# Patient Record
Sex: FEMALE | Race: WHITE | Employment: FULL TIME | URBAN - METROPOLITAN AREA
[De-identification: names, ages, dates, MRNs, and addresses within clinical notes are randomized per-mention and may not be internally consistent; named-entity substitution may affect disease eponyms.]

---

## 2017-06-29 ENCOUNTER — HOSPITAL ENCOUNTER (OUTPATIENT)
Dept: PHYSICAL THERAPY | Age: 52
Discharge: HOME | End: 2017-06-29
Payer: COMMERCIAL

## 2017-06-29 PROCEDURE — 97162 PT EVAL MOD COMPLEX 30 MIN: CPT

## 2017-06-29 PROCEDURE — 97110 THERAPEUTIC EXERCISES: CPT

## 2017-06-29 NOTE — PROGRESS NOTES
[facility] at [address] [facility]  [address]  Phone: [phone]      Fax:  [phone]    Plan of Care/ Statement of Necessity for Physical Therapy Services  Patient name: Aristeo Tilley Start of Care: 2017   Referral source: Whitney Saul MD : 1965    Medical Diagnosis: Right knee pain [M25.561]  Left knee pain [M25.562]   Onset Date: 2017    Treatment Diagnosis: B knee pain   Prior Hospitalization: see medical history Provider#: [id number]   Medications: Verified on Patient summary List    Comorbidities: heart disease, DM, arthritis, HTN, asthma   Prior Level of Function: Reports having \"arthritis\" pain in B knees before injury. The Plan of Care and following information is based on the information from the initial evaluation. Assessment/ key information:   Pt is a 45year old female who presents to therapy today with B knee pain right>left. Pt states that her right knee symptoms began in 2017 when she \"tore my ACl\". Pt states she does not remember a mechanism of injury. Pt states that her left knee \"hurts from arthritis\". Pt reports pain with walking, walking slowly, slowly ascending/descending stairs. Pt states she stopped walking for recreation and avoids squatting. Pt demonstrated decreased AROM, decreased strength, and increased swelling in the right knee. Impaired squat form noted as well. Instability noted with right LE SLS. No bruising noted in the right knee. Pt would benefit from physical therapy to improve the above impairments to help the pt return to performing ADLs, functional and recreational activities.      Evaluation Complexity History MEDIUM  Complexity : 1-2 comorbidities / personal factors will impact the outcome/ POC ; Examination HIGH Complexity : 4+ Standardized tests and measures addressing body structure, function, activity limitation and / or participation in recreation  ;Presentation MEDIUM Complexity : Evolving with changing characteristics  ; Clinical Decision Making MEDIUM Complexity : FOTO score of 26-74  Overall Complexity Rating: MEDIUM  Problem List: pain affecting function, decrease ROM, decrease strength, edema affecting function, impaired gait/ balance, decrease ADL/ functional abilitiies, decrease activity tolerance, decrease flexibility/ joint mobility and decrease transfer abilities   Treatment Plan may include any combination of the following: Therapeutic exercise, Therapeutic activities, Neuromuscular re-education, Physical agent/modality, Gait/balance training, Manual therapy, Patient education, Self Care training, Functional mobility training, Home safety training and Stair training  Patient / Family readiness to learn indicated by: asking questions, trying to perform skills and interest  Persons(s) to be included in education: patient (P)  Barriers to Learning/Limitations: None  Patient Goal (s): to walk better  Patient Self Reported Health Status: fair  Rehabilitation Potential: fair    Short Term Goals: To be accomplished in 2 weeks:  1. Pt will report compliance and independence to [facility] to help the pt manage their pain and symptoms. Long Term Goals: To be accomplished in 5 weeks:  1. Pt will increase FOTO score to 52 points to improve ability to perform ADLs. 2. Pt will increase MMT right knee flex and EXT to 3/5, right hip ABD to 4/5 to improve ability to ambulate with a normalized gait pattern. 3. Pt will increase AROM right knee to 2-125 degs to improve ability to ascend/descend stairs with more ease. 4. Pt will perform 5 squats with good form and equal weight bearing on each LE to improve ability to perform work tasks. 5. Pt will perform SLS on the right LE for 20 seconds to improve balance and stability needed for functional tasks. Frequency / Duration: Patient to be seen 2 times per week for 5 weeks.     Patient/ Caregiver education and instruction: Diagnosis, prognosis, self care, activity modification and exercises   [x]  Plan of care has been reviewed with LISETH An, PT 6/29/2017 9:37 AM  _____________________________________________________________________  I certify that the above Therapy Services are being furnished while the patient is under my care. I agree with the treatment plan and certify that this therapy is necessary.     Physician's Signature:____________________  Date:__________Time:______    Please sign and return to [facility] at [address] [address]  Phone: [phone]      Fax:  [phone]

## 2017-06-29 NOTE — PROGRESS NOTES
PT DAILY TREATMENT NOTE - [facility]     Patient Name: Sindi Archibald  Date:2017  : 1965  [x]  Patient  Verified  Payor: Judy Haines / Plan: Karla Gonzalez 77 HMO / Product Type: HMO /     In time:9:07  Out time:9:40  Total Treatment Time (min): 33  Visit #: 1 of 10    Treatment Area: Right knee pain [M25.561]  Left knee pain [M25.562]    SUBJECTIVE  Pain Level (0-10 scale): 1  Any medication changes, allergies to medications, adverse drug reactions, diagnosis change, or new procedure performed?: [x] No    [] Yes (see summary sheet for update)  Subjective functional status/changes:   [] No changes reported  See POC    OBJECTIVE    25 min [x]Eval                  []Re-Eval     8 min Therapeutic Exercise:  [] See flow sheet : HEP instruction and demonstration, pt education regarding anatomy and physiology of the LEs and the knee and how it relates to the pt's condition. Rationale: increase ROM and increase strength to improve the patients ability to tolerate ADLs          With   [] TE   [] TA   [] neuro   [] other: Patient Education: [x] Review HEP    [] Progressed/Changed HEP based on:   [] positioning   [] body mechanics   [] transfers   [] heat/ice application    [] other:      Other Objective/Functional Measures: See evaluation. Pain Level (0-10 scale) post treatment: 0    ASSESSMENT/Changes in Function: Pt given HEP handout to perform. Pt understood exercises in HEP handout. Pt demonstrated decreased AROM, decreased strength, and increased swelling in the right knee. Impaired squat form noted as well. Instability noted with right LE SLS. No bruising noted in the right knee. Pt would benefit from physical therapy to improve the above impairments to help the pt return to performing ADLs, functional and recreational activities.      Patient will continue to benefit from skilled PT services to modify and progress therapeutic interventions, address functional mobility deficits, address ROM deficits, address strength deficits, analyze and address soft tissue restrictions, analyze and cue movement patterns, analyze and modify body mechanics/ergonomics, address imbalance/dizziness and instruct in home and community integration to attain remaining goals. [x]  See Plan of Care  []  See progress note/recertification  []  See Discharge Summary         Progress towards goals / Updated goals:  Short Term Goals: To be accomplished in 2 weeks:  1. Pt will report compliance and independence to [facility] to help the pt manage their pain and symptoms. Eval: established   Long Term Goals: To be accomplished in 5 weeks:  1. Pt will increase FOTO score to 52 points to improve ability to perform ADLs. Eval: 33 points  2. Pt will increase MMT right knee flex and EXT to 3/5, right hip ABD to 4/5 to improve ability to ambulate with a normalized gait pattern. Eval: right knee flex and EXT 2-/5, right hip ABD 3+/5  3. Pt will increase AROM right knee to 2-125 degs to improve ability to ascend/descend stairs with more ease. Eval: 9-119 degs  4. Pt will perform 5 squats with good form and equal weight bearing on each LE to improve ability to perform work tasks. Eval: during mini squat, increased knee flex B, increased weight bearing on the left LE, increased forward weight shift  5. Pt will perform SLS on the right LE for 20 seconds to improve balance and stability needed for functional tasks.   Eval: 8 seconds 1st trial, 13 seconds 2nd trial     PLAN  [x]  Upgrade activities as tolerated     [x]  Continue plan of care  [x]  Update interventions per flow sheet       []  Discharge due to:_  []  Other:_      Sal Callahan PT 6/29/2017  9:38 AM    Future Appointments  Date Time Provider Kamla Mao   6/29/2017 9:00 AM JOEL [facility]

## 2017-07-03 ENCOUNTER — HOSPITAL ENCOUNTER (OUTPATIENT)
Dept: PHYSICAL THERAPY | Age: 52
Discharge: HOME | End: 2017-07-03
Payer: COMMERCIAL

## 2017-07-03 PROCEDURE — 97110 THERAPEUTIC EXERCISES: CPT

## 2017-07-03 PROCEDURE — 97112 NEUROMUSCULAR REEDUCATION: CPT

## 2017-07-03 NOTE — PROGRESS NOTES
PT DAILY TREATMENT NOTE     Patient Name: Ruthie Calderón  Date:7/3/2017  : 1965  [x]  Patient  Verified  Payor: Philippe Dean / Plan: Karla Gonzalez 77 HMO / Product Type: HMO /    In time:10:05  Out time:11:00  Total Treatment Time (min): 54  Visit #: 2 of 10    Treatment Area: Right knee pain [M25.561]  Left knee pain [M25.562]    SUBJECTIVE  Pain Level (0-10 scale): 0/10  Any medication changes, allergies to medications, adverse drug reactions, diagnosis change, or new procedure performed?: [x] No    [] Yes (see summary sheet for update)  Subjective functional status/changes:   [] No changes reported  \"No pain when I'm sitting. \"    OBJECTIVE    47 min Therapeutic Exercise:  [] See flow sheet :   Rationale: increase ROM, increase strength, improve coordination and improve balance to improve the patients ability to perform ADLs without difficulty. 8 min Neuromuscular Re-education:  []  See flow sheet :balance ex. Rationale: increase strength, improve coordination and improve balance  to improve the patients ability to perform ADLs without difficulty. With   [] TE   [] TA   [] neuro   [] other: Patient Education: [x] Review HEP    [] Progressed/Changed HEP based on:   [] positioning   [] body mechanics   [] transfers   [] heat/ice application    [] other:      Other Objective/Functional Measures:      Pain Level (0-10 scale) post treatment: 0/10    ASSESSMENT/Changes in Function: Initiated ex. Per flow sheet. Pt reported some weakness and instability during standing ex., but was able to perform. Patient will continue to benefit from skilled PT services to modify and progress therapeutic interventions, address functional mobility deficits, address ROM deficits, address strength deficits and analyze and address soft tissue restrictions to attain remaining goals.      []  See Plan of Care  []  See progress note/recertification  []  See Discharge Summary         Progress towards goals / Updated goals:    Short Term Goals: To be accomplished in 2 weeks:  1. Pt will report compliance and independence to [facility]P to help the pt manage their pain and symptoms.                       Eval: established   Current: MET. Pt reports compliance. Long Term Goals: To be accomplished in 5 weeks:  1. Pt will increase FOTO score to 52 points to improve ability to perform ADLs. Eval: 33 points  2. Pt will increase MMT right knee flex and EXT to 3/5, right hip ABD to 4/5 to improve ability to ambulate with a normalized gait pattern. Eval: right knee flex and EXT 2-/5, right hip ABD 3+/5  3. Pt will increase AROM right knee to 2-125 degs to improve ability to ascend/descend stairs with more ease. Eval: 9-119 degs  4. Pt will perform 5 squats with good form and equal weight bearing on each LE to improve ability to perform work tasks. Eval: during mini squat, increased knee flex B, increased weight bearing on the left LE, increased forward weight shift  5. Pt will perform SLS on the right LE for 20 seconds to improve balance and stability needed for functional tasks.   Eval: 8 seconds 1st trial, 13 seconds 2nd trial      PLAN  [x]  Upgrade activities as tolerated     [x]  Continue plan of care  []  Update interventions per flow sheet       []  Discharge due to:_  []  Other:_      Ramez Giles PTA 7/3/2017  10:41 AM    Future Appointments  Date Time Provider Kamla Mao   7/6/2017 11:00 AM [id number] [place] [address]

## 2017-07-06 ENCOUNTER — HOSPITAL ENCOUNTER (OUTPATIENT)
Dept: PHYSICAL THERAPY | Age: 52
Discharge: HOME | End: 2017-07-06
Payer: COMMERCIAL

## 2017-07-06 PROCEDURE — 97110 THERAPEUTIC EXERCISES: CPT

## 2017-07-11 ENCOUNTER — HOSPITAL ENCOUNTER (OUTPATIENT)
Dept: PHYSICAL THERAPY | Age: 52
Discharge: HOME | End: 2017-07-11
Payer: COMMERCIAL

## 2017-07-11 PROCEDURE — 97110 THERAPEUTIC EXERCISES: CPT

## 2017-07-11 NOTE — PROGRESS NOTES
PT DAILY TREATMENT NOTE     Patient Name: Yarely Lovelace  Date:2017  : 1965  [x]  Patient  Verified  Payor: Radha Aquilino / Plan: Karla Gonzalez 77 HMO / Product Type: HMO /    In time:9:00  Out time:9:50  Total Treatment Time (min): 50  Visit #: 4 of 10    Treatment Area: Right knee pain [M25.561]  Left knee pain [M25.562]    SUBJECTIVE  Pain Level (0-10 scale): 0/10  Any medication changes, allergies to medications, adverse drug reactions, diagnosis change, or new procedure performed?: [x] No    [] Yes (see summary sheet for update)  Subjective functional status/changes:   [] No changes reported  \"I felt pulling in the knee last night, woke me out of my sleep. \"    OBJECTIVE  50 min Therapeutic Exercise:  [] See flow sheet :   Rationale: increase ROM and increase strength to improve the patients ability to perform ADls without difficulty          With   [] TE   [] TA   [] neuro   [] other: Patient Education: [x] Review HEP    [] Progressed/Changed HEP based on:   [] positioning   [] body mechanics   [] transfers   [] heat/ice application    [] other:      Other Objective/Functional Measures: Right knee AROM 1-125. Pain Level (0-10 scale) post treatment: 0/10    ASSESSMENT/Changes in Function: Continued with current ex. Per flow sheet. Pt reported no increase in p! During or after therapy. Strength and ROM continue to improve as demonstrated during therapy. Patient will continue to benefit from skilled PT services to modify and progress therapeutic interventions, address functional mobility deficits, address ROM deficits, address strength deficits, analyze and address soft tissue restrictions and address imbalance/dizziness to attain remaining goals. []  See Plan of Care  []  See progress note/recertification  []  See Discharge Summary         Progress towards goals / Updated goals:  Short Term Goals: To be accomplished in 2 weeks:  1.  Pt will report compliance and independence to HEP to help the pt manage their pain and symptoms.                       Eval: established   Current: MET. Pt reports compliance. Long Term Goals: To be accomplished in 5 weeks:  1. Pt will increase FOTO score to 52 points to improve ability to perform ADLs. Eval: 33 points  2. Pt will increase MMT right knee flex and EXT to 3/5, right hip ABD to 4/5 to improve ability to ambulate with a normalized gait pattern. Eval: right knee flex and EXT 2-/5, right hip ABD 3+/5  3. Pt will increase AROM right knee to 2-125 degs to improve ability to ascend/descend stairs with more ease. Eval: 9-119 degs  Current:MET. Right knee AROM 1-125deg  4. Pt will perform 5 squats with good form and equal weight bearing on each LE to improve ability to perform work tasks. Eval: during mini squat, increased knee flex B, increased weight bearing on the left LE, increased forward weight shift  5. Pt will perform SLS on the right LE for 20 seconds to improve balance and stability needed for functional tasks. Eval: 8 seconds 1st trial, 13 seconds 2nd trial   Current: Current:MET. Pt was able to perform SLS on right leg for 29 seconds before LOB.     PLAN  [x]  Upgrade activities as tolerated     [x]  Continue plan of care  []  Update interventions per flow sheet       []  Discharge due to:_  []  Other:_      Pablo Diaz PTA 7/11/2017  9:04 AM    Future Appointments  Date Time Provider Kamla Mao   7/13/2017 9:00 AM Wojciech Aguillon PT [address]   7/17/2017 2:30 PM Pablo Diaz PTA [address]   7/20/2017 3:00 PM Pablo Diaz PTA [address]   7/24/2017 4:00 PM Wojciech Aguillon PT [address]   7/26/2017 4:00 PM Wojciech Aguillon PT [address]

## 2017-07-13 ENCOUNTER — HOSPITAL ENCOUNTER (OUTPATIENT)
Dept: PHYSICAL THERAPY | Age: 52
Discharge: HOME | End: 2017-07-13
Payer: COMMERCIAL

## 2017-07-13 PROCEDURE — 97110 THERAPEUTIC EXERCISES: CPT

## 2017-07-13 NOTE — PROGRESS NOTES
PT DAILY TREATMENT NOTE     Patient Name: Miguel Ellis  Date:2017  : 1965  [x]  Patient  Verified  Payor: Alek Germain / Plan: Karla Gonzalez 77 HMO / Product Type: HMO /    In time: 9:00  Out time: 9:44  Total Treatment Time (min): 44  Visit #: 5 of 10    Treatment Area: Right knee pain [M25.561]  Left knee pain [M25.562]    SUBJECTIVE  Pain Level (0-10 scale): 0  Any medication changes, allergies to medications, adverse drug reactions, diagnosis change, or new procedure performed?: [x] No    [] Yes (see summary sheet for update)  Subjective functional status/changes:   [] No changes reported  \"I am doing good so far today. I haven't been up for too long. I think therapy is helping. I can tell that my walking is better\"    OBJECTIVE    42 min Therapeutic Exercise:  [x] See flow sheet :   Rationale: increase ROM and increase strength to improve the patients ability to perform ADls     2 min Neuromuscular Re-education:  [x]  See flow sheet : balance exercises   Rationale: increase ROM and increase strength  to improve the patients ability to tolerate ADLs           With   [] TE   [] TA   [] neuro   [] other: Patient Education: [x] Review HEP    [] Progressed/Changed HEP based on:   [] positioning   [] body mechanics   [] transfers   [] heat/ice application    [] other:      Other Objective/Functional Measures: FOTO: 45 points. SLS on the right 12 seconds trial one, 20 seconds trial two, 30 seconds trial 3. Pain Level (0-10 scale) post treatment: 0 sore    ASSESSMENT/Changes in Function: Improvement in SLS on the right LE noted. Added SB squats, 1# weight to s/l hip ABD B, green tband to hipx3, and lateral bandwalks to improve stability and strength in the LEs. Continue POC as tolerated.      Patient will continue to benefit from skilled PT services to modify and progress therapeutic interventions, address functional mobility deficits, address ROM deficits, address strength deficits, analyze and address soft tissue restrictions and address imbalance/dizziness to attain remaining goals. []  See Plan of Care  []  See progress note/recertification  []  See Discharge Summary         Progress towards goals / Updated goals:  Short Term Goals: To be accomplished in 2 weeks:  1. Pt will report compliance and independence to [facility] to help the pt manage their pain and symptoms.                       Eval: established   Current: MET. Pt reports compliance. Long Term Goals: To be accomplished in 5 weeks:  1. Pt will increase FOTO score to 52 points to improve ability to perform ADLs. Eval: 33 points  Current: progressing, 45 points 7/13/2017  2. Pt will increase MMT right knee flex and EXT to 3/5, right hip ABD to 4/5 to improve ability to ambulate with a normalized gait pattern. Eval: right knee flex and EXT 2-/5, right hip ABD 3+/5  3. Pt will increase AROM right knee to 2-125 degs to improve ability to ascend/descend stairs with more ease. Eval: 9-119 degs  Current:MET. Right knee AROM 1-125deg  4. Pt will perform 5 squats with good form and equal weight bearing on each LE to improve ability to perform work tasks. Eval: during mini squat, increased knee flex B, increased weight bearing on the left LE, increased forward weight shift  5. Pt will perform SLS on the right LE for 20 seconds to improve balance and stability needed for functional tasks. Eval: 8 seconds 1st trial, 13 seconds 2nd trial   Current: Current:MET. Pt was able to perform SLS on right leg for 29 seconds before LOB.     PLAN  [x]  Upgrade activities as tolerated     [x]  Continue plan of care  [x]  Update interventions per flow sheet       []  Discharge due to:_  []  Other:_      Leatha Patterson, PT 7/13/2017  9:45 AM    Future Appointments  Date Time Provider Kamla Mao   7/13/2017 9:00 AM Leatha Patterson, PT [facility]   7/17/2017 2:30 PM Charmaine Solis PTA [facility]   7/20/2017 3:00 PM Chelo Nettles Rosezella Epley [facility]   7/24/2017 4:00 PM El Ortega, PT [facility]   7/26/2017 4:00 PM El Ortega, PT [facility]

## 2017-07-17 ENCOUNTER — HOSPITAL ENCOUNTER (OUTPATIENT)
Dept: PHYSICAL THERAPY | Age: 52
Discharge: HOME | End: 2017-07-17
Payer: COMMERCIAL

## 2017-07-17 PROCEDURE — 97110 THERAPEUTIC EXERCISES: CPT

## 2017-07-17 NOTE — PROGRESS NOTES
PT DAILY TREATMENT NOTE     Patient Name: Ruthie Calderón  Date:2017  : 1965  [x]  Patient  Verified  Payor: Philippe Dean / Plan: Karla Gonzalez 77 HMO / Product Type: HMO /    In time:2:30  Out time:3:20  Total Treatment Time (min): 50  Visit #: 6 of 10    Treatment Area: Right knee pain [M25.561]  Left knee pain [M25.562]    SUBJECTIVE  Pain Level (0-10 scale): 0/10  Any medication changes, allergies to medications, adverse drug reactions, diagnosis change, or new procedure performed?: [x] No    [] Yes (see summary sheet for update)  Subjective functional status/changes:   [] No changes reported  \"I feel good. \"    OBJECTIVE  50 min Therapeutic Exercise:  [] See flow sheet :   Rationale: increase ROM and increase strength to improve the patients ability to perform ADLs without difficulty. With   [] TE   [] TA   [] neuro   [] other: Patient Education: [x] Review HEP    [] Progressed/Changed HEP based on:   [] positioning   [] body mechanics   [] transfers   [] heat/ice application    [] other:      Other Objective/Functional Measures:      Pain Level (0-10 scale) post treatment: 0/10     ASSESSMENT/Changes in Function: Pt reported some discomfort with side step ups and was instructed to stop. No p! Was reported after therapy today. Patient will continue to benefit from skilled PT services to address ROM deficits, address strength deficits, analyze and address soft tissue restrictions and analyze and cue movement patterns to attain remaining goals. []  See Plan of Care  []  See progress note/recertification  []  See Discharge Summary         Progress towards goals / Updated goals:  Short Term Goals: To be accomplished in 2 weeks:  1. Pt will report compliance and independence to HEP to help the pt manage their pain and symptoms.                       Eval: established   Current: MET. Pt reports compliance. Long Term Goals: To be accomplished in 5 weeks:  1.  Pt will increase FOTO score to 52 points to improve ability to perform ADLs. Eval: 33 points  Current: progressing, 45 points 7/13/2017  2. Pt will increase MMT right knee flex and EXT to 3/5, right hip ABD to 4/5 to improve ability to ambulate with a normalized gait pattern. Eval: right knee flex and EXT 2-/5, right hip ABD 3+/5  3. Pt will increase AROM right knee to 2-125 degs to improve ability to ascend/descend stairs with more ease. Eval: 9-119 degs  Current:MET. Right knee AROM 1-125deg  4. Pt will perform 5 squats with good form and equal weight bearing on each LE to improve ability to perform work tasks. Eval: during mini squat, increased knee flex B, increased weight bearing on the left LE, increased forward weight shift  5. Pt will perform SLS on the right LE for 20 seconds to improve balance and stability needed for functional tasks. Eval: 8 seconds 1st trial, 13 seconds 2nd trial   Current: Current:MET.  Pt was able to perform SLS on right leg for 29 seconds before LOB.       PLAN  [x]  Upgrade activities as tolerated     [x]  Continue plan of care  []  Update interventions per flow sheet       []  Discharge due to:_  []  Other:_      Cole Barkley PTA 7/17/2017  3:18 PM    Future Appointments  Date Time Provider Kamla Mao   7/20/2017 3:00 PM Cole Barkley PTA [facility]   7/24/2017 4:00 PM Amos Jones PT [facility]   7/26/2017 4:00 PM [employer]

## 2017-07-20 ENCOUNTER — APPOINTMENT (OUTPATIENT)
Dept: PHYSICAL THERAPY | Age: 52
End: 2017-07-20
Payer: COMMERCIAL

## 2017-07-24 ENCOUNTER — HOSPITAL ENCOUNTER (OUTPATIENT)
Dept: PHYSICAL THERAPY | Age: 52
Discharge: HOME | End: 2017-07-24
Payer: COMMERCIAL

## 2017-07-24 PROCEDURE — 97110 THERAPEUTIC EXERCISES: CPT

## 2017-07-24 NOTE — PROGRESS NOTES
PT DAILY TREATMENT NOTE     Patient Name: Emery Valentine  Date:2017  : 1965  [x]  Patient  Verified  Payor: Lilliana Treviñoy / Plan: Carry Farhan Jovelwetorin [employer] HMO / Product Type: HMO /    In time: 4:06  Out time: 4:54  Total Treatment Time (min): 48  Visit #: 7 of 10    Treatment Area: Right knee pain [M25.561]  Left knee pain [M25.562]    SUBJECTIVE  Pain Level (0-10 scale): 0  Any medication changes, allergies to medications, adverse drug reactions, diagnosis change, or new procedure performed?: [x] No    [] Yes (see summary sheet for update)  Subjective functional status/changes:   [] No changes reported  \"I have been feeling better. I can tell a difference since starting therapy\"    OBJECTIVE    43 min Therapeutic Exercise:  [x] See flow sheet :   Rationale: increase ROM and increase strength to improve the patients ability to perform ADLs    5 min Neuromuscular Re-education:  [x]  See flow sheet : foam exercises, SB exercises   Rationale: increase ROM and increase strength  to improve the patients ability to tolerate ADls          With   [] TE   [] TA   [] neuro   [] other: Patient Education: [x] Review HEP    [] Progressed/Changed HEP based on:   [] positioning   [] body mechanics   [] transfers   [] heat/ice application    [] other:      Other Objective/Functional Measures: SLS on foam right LE 8 seconds trial 1, 9 seconds trial 2. SLS on foam left LE 15 seconds. Pain Level (0-10 scale) post treatment: 0     ASSESSMENT/Changes in Function: Mild instability noted with tandem balance on foam (either LE in front). Increased stability noted with SLS right>left LE and needed to use stepping strategy for balance. No increased pain reported post session. Continue POC as tolerated.      Patient will continue to benefit from skilled PT services to modify and progress therapeutic interventions, address functional mobility deficits, address ROM deficits, address strength deficits, analyze and address soft tissue restrictions, analyze and cue movement patterns, analyze and modify body mechanics/ergonomics, address imbalance/dizziness and instruct in home and community integration to attain remaining goals. []  See Plan of Care  []  See progress note/recertification  []  See Discharge Summary         Progress towards goals / Updated goals:  Short Term Goals: To be accomplished in 2 weeks:  1. Pt will report compliance and independence to [facility] to help the pt manage their pain and symptoms.                       Eval: established   Current: MET. Pt reports compliance. Long Term Goals: To be accomplished in 5 weeks:  1. Pt will increase FOTO score to 52 points to improve ability to perform ADLs. Eval: 33 points  Current: progressing, 45 points 7/13/2017  2. Pt will increase MMT right knee flex and EXT to 3/5, right hip ABD to 4/5 to improve ability to ambulate with a normalized gait pattern. Eval: right knee flex and EXT 2-/5, right hip ABD 3+/5  3. Pt will increase AROM right knee to 2-125 degs to improve ability to ascend/descend stairs with more ease. Eval: 9-119 degs  Current:MET. Right knee AROM 1-125deg  4. Pt will perform 5 squats with good form and equal weight bearing on each LE to improve ability to perform work tasks. Eval: during mini squat, increased knee flex B, increased weight bearing on the left LE, increased forward weight shift  5. Pt will perform SLS on the right LE for 20 seconds to improve balance and stability needed for functional tasks. Eval: 8 seconds 1st trial, 13 seconds 2nd trial   Current: Current:MET.  Pt was able to perform SLS on right leg for 29 seconds before LOB.     PLAN  [x]  Upgrade activities as tolerated     [x]  Continue plan of care  [x]  Update interventions per flow sheet       []  Discharge due to:_  []  Other:_      Oral Cos, PT 7/24/2017  4:50 PM    Future Appointments  Date Time Provider Kamla Mao   7/24/2017 4:00 PM Oral Cos, PT [place] [facility]   7/26/2017 4:00 PM Cole Barkley PTA [place] [facility]

## 2017-07-26 ENCOUNTER — HOSPITAL ENCOUNTER (OUTPATIENT)
Dept: PHYSICAL THERAPY | Age: 52
Discharge: HOME | End: 2017-07-26
Payer: COMMERCIAL

## 2017-07-26 PROCEDURE — 97110 THERAPEUTIC EXERCISES: CPT

## 2017-07-26 NOTE — PROGRESS NOTES
PT DAILY TREATMENT NOTE     Patient Name: Marcos Noble  Date:2017  : 1965  [x]  Patient  Verified  Payor: Marcelina Herrera / Plan: Karla Coonenwetorin [employer] HMO / Product Type: HMO /    In time:4:05  Out time:4:40  Total Treatment Time (min): 35  Visit #: 8 of 10    Treatment Area: Right knee pain [M25.561]  Left knee pain [M25.562]    SUBJECTIVE  Pain Level (0-10 scale): 0/10  Any medication changes, allergies to medications, adverse drug reactions, diagnosis change, or new procedure performed?: [x] No    [] Yes (see summary sheet for update)  Subjective functional status/changes:   [] No changes reported  \"No p! All week. \"    OBJECTIVE    35 min Therapeutic Exercise:  [] See flow sheet :   Rationale: increase ROM and increase strength to improve the patients ability to perform ADLs without difficulty. With   [] TE   [] TA   [] neuro   [] other: Patient Education: [x] Review HEP    [] Progressed/Changed HEP based on:   [] positioning   [] body mechanics   [] transfers   [] heat/ice application    [] other:      Other Objective/Functional Measures: Functional Gains: \"pain, mobility\"  Functional Deficits: \"not 100% yet\"  % improvement: 80%-85%  Pain   Average: 0/10       Best: 0/10     Worst: 0/10  FOTO 64  Pain Level (0-10 scale) post treatment: 0/10    ASSESSMENT/Changes in Function: Continued with current ex. Per flow sheet. Pt is being D/C today due to meeting goals and being independent with HEP. []  See Plan of Care  []  See progress note/recertification  []  See Discharge Summary         Progress towards goals / Updated goals:  Short Term Goals: To be accomplished in 2 weeks:  1. Pt will report compliance and independence to HEP to help the pt manage their pain and symptoms.                       Eval: established   Current: MET. Pt reports compliance. Long Term Goals: To be accomplished in 5 weeks:  1.  Pt will increase FOTO score to 52 points to improve ability to perform ADLs. Eval: 33 points  Current: MET. 64  2. Pt will increase MMT right knee flex and EXT to 3/5, right hip ABD to 4/5 to improve ability to ambulate with a normalized gait pattern. Eval: right knee flex and EXT 2-/5, right hip ABD 3+/5  Current: MET.right knee flex and EXT 5/5, right hip ABD 5/5  3. Pt will increase AROM right knee to 2-125 degs to improve ability to ascend/descend stairs with more ease. Eval: 9-119 degs  Current:MET. Right knee AROM 1-125deg  4. Pt will perform 5 squats with good form and equal weight bearing on each LE to improve ability to perform work tasks. Eval: during mini squat, increased knee flex B, increased weight bearing on the left LE, increased forward weight shift  5. Pt will perform SLS on the right LE for 20 seconds to improve balance and stability needed for functional tasks. Eval: 8 seconds 1st trial, 13 seconds 2nd trial   Current:MET. Pt was able to perform SLS on right leg for 29 seconds before LOB.     PLAN  []  Upgrade activities as tolerated     []  Continue plan of care  []  Update interventions per flow sheet       [x]  Discharge due to:_goals met  []  Other:_      Marce Tenorio PTA 7/26/2017  3:53 PM    Future Appointments  Date Time Provider Kamla Mao   7/26/2017 4:00 PM Robbi Magallanes

## 2017-07-31 NOTE — PROGRESS NOTES
[facility] at [address] [place] [address]  Phone: [phone]      Fax:  [phone]    Discharge Summary    Patient name: Kathia Webb Start of Care: 2017   Referral source: Ronna Oviedo MD : 1965                         Medical Diagnosis: Right knee pain [M25.561]  Left knee pain [M25.562] Onset Date: 2017                         Treatment Diagnosis: B knee pain   Prior Hospitalization: see medical history Provider#: [id number]   Medications: Verified on Patient summary List    Comorbidities: heart disease, DM, arthritis, HTN, asthma   Prior Level of Function: Reports having \"arthritis\" pain in B knees before injury. Visits from Start of Care: 8    Missed Visits: 1  Reporting Period : 2017 to 2017    Goal:Pt will report compliance and independence to [facility] to help the pt manage their pain and symptoms.   Status at last note/certification:MET. Pt reports compliance. Status at discharge: met    Goal:Pt will increase FOTO score to 52 points to improve ability to perform ADLs. Status at last note/certification:MET. 59  Status at discharge: met    Goal:Pt will increase MMT right knee flex and EXT to 3/5, right hip ABD to 4/5 to improve ability to ambulate with a normalized gait pattern. Status at last note/certification:MET.right knee flex and EXT 5/5, right hip ABD 5/5  Status at discharge: met    Goal:Pt will increase AROM right knee to 2-125 degs to improve ability to ascend/descend stairs with more ease. Status at last note/certification:MET. Right knee AROM 1-125deg  Status at discharge: met    Goal:Pt will perform 5 squats with good form and equal weight bearing on each LE to improve ability to perform work tasks. Status at last note/certification: not assessed today but reports no pain in the B knees.    Status at discharge: not met    Goal:Pt will perform SLS on the right LE for 20 seconds to improve balance and stability needed for functional tasks. Status at last note/certification: MET. Pt was able to perform SLS on right leg for 29 seconds before LOB  Status at discharge: met    Functional Gains: \"pain, mobility\"  Functional Deficits: \"not 100% yet\"  % improvement: 80%-85%  Pain   Average: 0/10                            Best: 0/10                          Worst: 0/10    Assessment/ Summary of Care:   Pt was seen for 8 therapy sessions. Pt demonstrated/reports improvements in pain, ROM, strength, and mobility since starting therapy. Pt reports 80-85% improvement in symptoms since start of care. Pt is d/c'ed from therapy at this time to HEP secondary to meeting goals above and ability to independently perform HEP to manage current deficits.      RECOMMENDATIONS:  [x]Discontinue therapy: [x]Patient has reached or is progressing toward set goals      []Patient is non-compliant or has abdicated      []Due to lack of appreciable progress towards set goals    Marion Shipman, PT 7/31/2017 1:21 PM

## 2018-05-07 ENCOUNTER — APPOINTMENT (OUTPATIENT)
Dept: PHYSICAL THERAPY | Age: 53
End: 2018-05-07
Payer: COMMERCIAL

## 2018-05-08 ENCOUNTER — HOSPITAL ENCOUNTER (OUTPATIENT)
Dept: PHYSICAL THERAPY | Age: 53
End: 2018-05-08
Payer: COMMERCIAL

## 2018-05-09 ENCOUNTER — HOSPITAL ENCOUNTER (OUTPATIENT)
Dept: PHYSICAL THERAPY | Age: 53
Discharge: HOME | End: 2018-05-09
Payer: COMMERCIAL

## 2018-05-09 PROCEDURE — 97110 THERAPEUTIC EXERCISES: CPT | Performed by: PHYSICAL THERAPIST

## 2018-05-09 PROCEDURE — 97162 PT EVAL MOD COMPLEX 30 MIN: CPT | Performed by: PHYSICAL THERAPIST

## 2018-05-09 PROCEDURE — 97112 NEUROMUSCULAR REEDUCATION: CPT | Performed by: PHYSICAL THERAPIST

## 2018-05-09 NOTE — PROGRESS NOTES
PT DAILY TREATMENT NOTE     Patient Name: Iker Riley  Date:2018  : 1965  [x]  Patient  Verified  Payor: Nkechi Homaurizio / Plan: [employer] / Product Type: HMO /    In time:11:00  Out time:11:50  Total Treatment Time (min): 50  Visit #: 1 of 18    Treatment Area: S/P lumbar fusion [Z98.1]  Lumbar radiculopathy [M54.16]    SUBJECTIVE  Pain Level (0-10 scale): 0/10  Any medication changes, allergies to medications, adverse drug reactions, diagnosis change, or new procedure performed?: [x] No    [] Yes (see summary sheet for update)  Subjective functional status/changes:   [] No changes reported  See Evaluation. OBJECTIVE    20 min [x]Eval                  []Re-Eval       15 min Therapeutic Exercise:  [] See flow sheet :   Rationale: increase ROM, increase strength and improve coordination to improve the patients ability to increase patient's functional activity level. 15 min Neuromuscular Re-education:  []  See flow sheet :   Rationale: increase strength, improve coordination and increase proprioception  to improve the patients ability to increase core strength to improve lumbar stability. With   [] TE   [] TA   [] neuro   [] other: Patient Education: [x] Review HEP    [] Progressed/Changed HEP based on:   [] positioning   [] body mechanics   [] transfers   [] heat/ice application    [] other:      Other Objective/Functional Measures: See Evaluation. Pain Level (0-10 scale) post treatment: 0-1/10    ASSESSMENT/Changes in Function: See Evaluation. Patient will continue to benefit from skilled PT services to modify and progress therapeutic interventions, address functional mobility deficits, address ROM deficits, analyze and address soft tissue restrictions, analyze and cue movement patterns, analyze and modify body mechanics/ergonomics and assess and modify postural abnormalities to attain remaining goals.      [x]  See Plan of Care  []  See progress note/recertification  []  See Discharge Summary         Progress towards goals / Updated goals:  1. Patient's pain level will be 0-1/10 with activity in order to improve patient's ability to perform normal ADLs. Eval:  0/10-1/10.  2. Patient will demonstrate lumbar AROM right and left rotation 50 degrees; right and left side bend 15 degrees to increase ease of ADLs. Eval:  Right rotation: 30; left rotation 35; right side bend 5; left side bend 10 degrees. 3. Patient will increase FOTO score to 53 to increase functional mobility. Eval:  38  4. Patient will be able to ambulate 500 feet independent in order to regain normal functional ambulation. Eval:   Able to walk 200 feet. 5. Patient will be able to tolerate standing for 30 minutes in order to return to her normal household chores and ADLs.   Eval:  Standing tolerance is 15 minutes.       PLAN  [x]  Upgrade activities as tolerated     [x]  Continue plan of care  []  Update interventions per flow sheet       []  Discharge due to:_  []  Other:_      Jr Blair, PT 5/9/2018  10:26 AM    Future Appointments  Date Time Provider Kamla Mao   5/9/2018 11:00 AM Jr Blair, JOEL Sotelo

## 2018-05-09 NOTE — PROGRESS NOTES
[facility] at [address] [facility]  [address]  Phone: [phone]      Fax:  [phone]    Plan of Care/ Statement of Necessity for Physical Therapy Services  Patient name: Gege Treviño Start of Care: 2018   Referral source: BRYAN Stone : 1965    Medical Diagnosis: S/P lumbar fusion [Z98.1]  Lumbar radiculopathy [M54.16]   Onset Date:18    Treatment Diagnosis: Lumbar Radiculopathy   Prior Hospitalization: see medical history Provider#: [id number]   Medications: Verified on Patient summary List    Comorbidities: Arthritis, Back pain, Diabetes, Heart Attack (), HTN, BMI over 30. Prior Level of Function: Very limited due to back pain, was unable to stand, do housework, cook or walk. The Plan of Care and following information is based on the information from the initial evaluation. Assessment/ key information: Patient with signs and symptoms consistent with lumbar radiculopathy s/p lumbar fusion. Patient will benefit from a program of skilled physical therapy to include therapeutic exercises to address strength deficits, therapeutic activities to improve functional mobility, neuromuscular reeducation to address balance, coordination and proprioception, manual therapy to address ROM and tissue extensibility and modalities as indicated. All questions were answered. Evaluation Complexity History HIGH Complexity :3+ comorbidities / personal factors will impact the outcome/ POC ; Examination MEDIUM Complexity : 3 Standardized tests and measures addressing body structure, function, activity limitation and / or participation in recreation  ;Presentation HIGH Complexity : Unstable and unpredictable characteristics  ; Clinical Decision Making MEDIUM Complexity : FOTO score of 26-74  Overall Complexity Rating: MEDIUM  Problem List: decrease ROM, decrease strength, impaired gait/ balance, decrease ADL/ functional abilitiies, decrease activity tolerance and decrease flexibility/ joint mobility   Treatment Plan may include any combination of the following: Therapeutic exercise, Therapeutic activities, Neuromuscular re-education, Physical agent/modality and Manual therapy  Patient / Family readiness to learn indicated by: asking questions and interest  Persons(s) to be included in education: patient (P)  Barriers to Learning/Limitations: None  Patient Goal (s): I want to get my ROM back, \"I want to get back to gardening and walk normal again\". Patient Self Reported Health Status: good  Rehabilitation Potential: good    Short Term Goals: To be accomplished in 1 weeks:  1. Patient will become proficient in her HEP and will be compliant in performing that program.    Long Term Goals: To be accomplished in 6 weeks:  1. Patient's pain level will be 0-1/10 with activity in order to improve patient's ability to perform normal ADLs. 2. Patient will demonstrate lumbar AROM right and left rotation 50 degrees; right and left side bend 15 degrees to increase ease of ADLs. 3. Patient will increase FOTO score to 53 to increase functional mobility. 4. Patient will be able to ambulate 500 feet independent in order to regain normal functional ambulation. 5. Patient will be able to tolerate standing for 30 minutes in order to return to her normal household chores and ADLs. Frequency / Duration: Patient to be seen 2-3 times per week for 6 weeks. Patient/ Caregiver education and instruction: Diagnosis, prognosis, exercises. [x]  Plan of care has been reviewed with LISETH Santos, PT 5/9/2018 10:25 AM  _____________________________________________________________________  I certify that the above Therapy Services are being furnished while the patient is under my care. I agree with the treatment plan and certify that this therapy is necessary.     Physician's Signature:____________________  Date:__________Time:______    Please sign and return to [facility] at [facility] [place] [place]  [address] [facility]  [place], [facility] [address]  Phone: [phone]      Fax:  [phone]

## 2018-05-11 ENCOUNTER — APPOINTMENT (OUTPATIENT)
Dept: PHYSICAL THERAPY | Age: 53
End: 2018-05-11
Payer: COMMERCIAL

## 2018-05-15 ENCOUNTER — APPOINTMENT (OUTPATIENT)
Dept: PHYSICAL THERAPY | Age: 53
End: 2018-05-15
Payer: COMMERCIAL

## 2018-05-18 ENCOUNTER — APPOINTMENT (OUTPATIENT)
Dept: PHYSICAL THERAPY | Age: 53
End: 2018-05-18
Payer: COMMERCIAL

## 2018-05-22 ENCOUNTER — APPOINTMENT (OUTPATIENT)
Dept: PHYSICAL THERAPY | Age: 53
End: 2018-05-22
Payer: COMMERCIAL

## 2018-05-24 ENCOUNTER — APPOINTMENT (OUTPATIENT)
Dept: PHYSICAL THERAPY | Age: 53
End: 2018-05-24
Payer: COMMERCIAL

## 2018-05-29 ENCOUNTER — APPOINTMENT (OUTPATIENT)
Dept: PHYSICAL THERAPY | Age: 53
End: 2018-05-29
Payer: COMMERCIAL

## 2018-05-31 ENCOUNTER — APPOINTMENT (OUTPATIENT)
Dept: PHYSICAL THERAPY | Age: 53
End: 2018-05-31
Payer: COMMERCIAL

## 2018-06-18 NOTE — PROGRESS NOTES
[facility] at [address] [facility]  [address]  Phone: [phone]      Fax:  [phone]    Discharge Summary    Patient name: Emery Valentine Start of Care: 18   Referral source: BRYAN Parr : 1965   Medical/Treatment Diagnosis: S/P lumbar fusion [Z98.1]  Lumbar radiculopathy [M54.16] Onset Date:18     Prior Hospitalization: see medical history Provider#: [id number]   Medications: Verified on Patient Summary List    Comorbidities: Arthritis, Back pain, Diabetes, Heart Attack (), HTN, BMI over 30. Prior Level of Function: Very limited due to back pain, was unable to stand, do housework, cook or walk.      Visits from Start of Care: 1    Missed Visits: 5    Reporting Period : 18 to 18    Goal: Patient's pain level will be 0-1/10 with activity in order to improve patient's ability to perform normal ADLs. Status at last note/certification:   Status at discharge: met    Goal: Patient will demonstrate lumbar AROM right and left rotation 50 degrees; right and left side bend 15 degrees to increase ease of ADLs. Status at last note/certification: Right rotation: 30; left rotation 35; right side bend 5; left side bend 10 degrees. Status at discharge: not met    Goal: Patient will be able to ambulate 500 feet independent in order to regain normal functional ambulation. Status at last note/certification:   Able to walk 200 feet. Status at discharge: not met    Goal: Patient will be able to tolerate standing for 30 minutes in order to return to her normal household chores and ADLs. Status at last note/certification: Standing tolerance is 15 minutes  Status at discharge: not met    Assessment/ Summary of Care: Patient called and cancelled her visits, each time stating that she was sick. She did that on her 5 scheduled appointments requesting that we not cancel all of her visits, but she ended up cancelling.   We advised her to call us when feeling better.     RECOMMENDATIONS:  [x]Discontinue therapy: []Patient has reached or is progressing toward set goals      [x]Patient is non-compliant or has abdicated      []Due to lack of appreciable progress towards set goals    Susan Henson, PT 6/18/2018 8:50 AM